# Patient Record
(demographics unavailable — no encounter records)

---

## 2024-10-29 NOTE — PHYSICAL EXAM
[Normal] : grossly intact [de-identified] : Emaciated, in bed, on oxygen, minimally speaking [de-identified] : On oxygen [de-identified] : Fatigued

## 2024-10-29 NOTE — PHYSICAL EXAM
[Normal] : grossly intact [de-identified] : Emaciated, in bed, on oxygen, minimally speaking [de-identified] : On oxygen [de-identified] : Fatigued

## 2024-10-29 NOTE — RESULTS/DATA
[FreeTextEntry1] : 3/14/24: MR Abdomen- 1. Moderate splenomegaly. Slight signal increase in liver on out-of-phase compared to in-phase sequences, with marked signal decrease in liver, spleen, and bone-marrow on T2 weighted sequences, compatible with secondary hemochromatosis. Variable enhancement throughout visualized spine (some vertebral bodies relatively hyperenhancing; other vertebral bodies relatively hyperenhancing). Indeterminate. Diffuse enhancement throughout visualized bony pelvis, indeterminate. Recommend clinical and/or laboratory correlation. Suggest nuclear medicine whole body bone scan and/or PET/CT. Consider bone marrow biopsy. 2. Mild wall thickening of proximal-mid stomach. Mild stool burden of entire ascending colon. Mild wall enhancement of proximal transverses colon. Consider upper and/or lower endoscopy. 3. Additional findings as noted above.

## 2024-10-29 NOTE — ASSESSMENT
[FreeTextEntry1] : Mr. 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 Today, Mr. Estrada and I met for the first time via televideo conference and discussed his past medical history, his overall plan of care, his current functional status which remains better chair bound, recent genetic studies including an ERBB2 exon 20 insertion found on CT DNA testing, overall prognosis, and potential for future treatments. I related that I am overall gravely concerned and estimate his life expectancy in weeks to months, that there is no safe anticancer therapy available at this time given his exceptionally poor tolerance to dose reduce gemcitabine, and that even a targeted therapy in the form of TDXD is contraindicated given his current functional status and poor recent response to chemotherapy.  Moreover, in the context of HER2 mutated cancer of unknown primary, there is no current FDA indication for the use of TDXD although there is evidence for an NSCLC that there might be some benefit albeit with significant side effect profile (>G3 toxicity in 47% of healthy trial participants).  I reinforced that at this time to focus on quality of life over longevity may be appropriate, but that I was always open to seeing Mr. Estrada again if his strength and functional status improve to consider anticancer therapies.  #Tranfusion Dependence #Cancer Associated Fatigue #Renal Dysfunction #Hepatic Dysfunction #Metastatic CUP to Marrow:  - Recommended focus on palliation - No Role for systemic treatments at this time - Continue with prior physical therapy - Follow-up appointment PRN depending on improvement in functional status  Dr. Km Louis

## 2024-10-29 NOTE — HISTORY OF PRESENT ILLNESS
[de-identified] : Evin Estrada is a previously healthy 73-year-old male who presents in the setting of recently diagnosed Cancer of Unknown Primary (CUP) metastatic to bones and bone marrow with course complicated by failure to thrive and progressive transfusion dependence.   Mr. Estrada was at his baseline state of health until November of 2023 when he received a screening CT Chest (background of 15-20 years smoking) which identified multiple sclerotic lesions of the spine. 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 He was initiated on momelotinib without improvement in symptom burden and eventually underwent repeat bone marrow several months later which was concerning for adenocarcinoma infiltrative of the marrow. Since then he has underwent complete evaluation with PET-CT, EUS, Endoscopy, and colonoscopy which has demonstrated a single hepatic lymph node in addition to marrow involvement.   Since that time he has had a marked and rapid functional decline. He is transfusion dependent (pRBC and platelets) and has had a concerning rise in his bilirubin (2.3 as of 8/22/2024). He has lost 30 pounds in the second half of 2024 and is bed or chair-bound. Additional labs are notable for CA 19-9 of 74.5.   Received reduced dose Gemcitabine 6 weeks prior to this visit at [place] which was very hard to tolerate and led to hospitalization. Currently in a rehab facility since that time. Worsening renal and hepatic dysfunction since that time; improving over last several weeks. Transfusions at ~ 1 x 2 weeks.   ECOG 4.   Guardant with circulating ctDNA ERBB2 exon 20 insertion at 4.1% (high from background).   3/14/24 - MRCP: 1. Moderate splenomegaly. Slight signal increase in liver on out-of-phase compared to in phase sequences, with marked signal decrease in liver, spleen, and bone marrow on T2- weighted sequences, compatible with secondary hemochromatosis. Variable enhancement throughout visualized spine (some vertebral bodies relatively hyperenhancing; other vertebral bodies relatively hypoenhancing), indeterminate. Diffuse enhancement throughout visualized bony pelvis, indeterminate. Recommend clinical and/or laboratory correlation. Suggest nuclear medicine whole body bone scan and/or PET/CT. Consider bone marrow biopsy. 2. Mild wall thickening of proximal-mid stomach. Mild stool burden of entire ascending colon. Mild wall enhancement of proximal transverse colon. Consider upper and/or lower endoscopy.  3. Additional findings as noted above.  7/16/24 - BM Biopsy: Bone marrow, left iliac biopsy, clot and aspirate: - metastatic carcinoma in fibrotic marrow, CAM5.2, AE1:3, MOC31, CK7 and EMA highlight the individual and small clusters of epithelial cells; no staining is seen for CK20, p40, TTF1 and prostate markers (PSMA and NKX3.1)  - the immunoresults suggest adenocarcinoma, however, specific site of origin cannot be definitively determined, possible primary sites include lung, upper gastrointestinal tract, pancreatobiliary tree among others. - CPS 0  8/9/24 - EGD: A. Duodenal nodule; biopsy: - Enteric mucosa with focus of gastric foveolar metaplasia, suggestive of chronic duodenitis. B. Esophageal nodule; biopsy: - Features suggestive of squamous papilloma.  8/14/24 - EUS A. Portal hepatic lymph node FNB: Poorly-differentiated adenocarcinoma, see note.  Note: Immunostains were performed. The lesional cells are positive for CK7 and CK19. There is no immunoreactivity for CDX2 or CK20.  Based on the findings, an upper gastrointestinal tract or pancreatobiliary origin may be considered. Clinical and radiographic correlation is recommended. Immunostains for DNA repair proteins demonstrate preservation of staining for MLH1, MSH2, MSH6, and PMS2 within tumor cell nuclei. These results do not suggest the presence of any underlying abnormality in DNA repair mechanisms. HER2 is negative (score 0). PD-L1 is positive (CPS 1).

## 2024-10-29 NOTE — HISTORY OF PRESENT ILLNESS
[de-identified] : Evin Estrada is a previously healthy 73-year-old male who presents in the setting of recently diagnosed Cancer of Unknown Primary (CUP) metastatic to bones and bone marrow with course complicated by failure to thrive and progressive transfusion dependence.   Mr. Estrada was at his baseline state of health until November of 2023 when he received a screening CT Chest (background of 15-20 years smoking) which identified multiple sclerotic lesions of the spine. 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 He was initiated on momelotinib without improvement in symptom burden and eventually underwent repeat bone marrow several months later which was concerning for adenocarcinoma infiltrative of the marrow. Since then he has underwent complete evaluation with PET-CT, EUS, Endoscopy, and colonoscopy which has demonstrated a single hepatic lymph node in addition to marrow involvement.   Since that time he has had a marked and rapid functional decline. He is transfusion dependent (pRBC and platelets) and has had a concerning rise in his bilirubin (2.3 as of 8/22/2024). He has lost 30 pounds in the second half of 2024 and is bed or chair-bound. Additional labs are notable for CA 19-9 of 74.5.   Received reduced dose Gemcitabine 6 weeks prior to this visit at [place] which was very hard to tolerate and led to hospitalization. Currently in a rehab facility since that time. Worsening renal and hepatic dysfunction since that time; improving over last several weeks. Transfusions at ~ 1 x 2 weeks.   ECOG 4.   Guardant with circulating ctDNA ERBB2 exon 20 insertion at 4.1% (high from background).   3/14/24 - MRCP: 1. Moderate splenomegaly. Slight signal increase in liver on out-of-phase compared to in phase sequences, with marked signal decrease in liver, spleen, and bone marrow on T2- weighted sequences, compatible with secondary hemochromatosis. Variable enhancement throughout visualized spine (some vertebral bodies relatively hyperenhancing; other vertebral bodies relatively hypoenhancing), indeterminate. Diffuse enhancement throughout visualized bony pelvis, indeterminate. Recommend clinical and/or laboratory correlation. Suggest nuclear medicine whole body bone scan and/or PET/CT. Consider bone marrow biopsy. 2. Mild wall thickening of proximal-mid stomach. Mild stool burden of entire ascending colon. Mild wall enhancement of proximal transverse colon. Consider upper and/or lower endoscopy.  3. Additional findings as noted above.  7/16/24 - BM Biopsy: Bone marrow, left iliac biopsy, clot and aspirate: - metastatic carcinoma in fibrotic marrow, CAM5.2, AE1:3, MOC31, CK7 and EMA highlight the individual and small clusters of epithelial cells; no staining is seen for CK20, p40, TTF1 and prostate markers (PSMA and NKX3.1)  - the immunoresults suggest adenocarcinoma, however, specific site of origin cannot be definitively determined, possible primary sites include lung, upper gastrointestinal tract, pancreatobiliary tree among others. - CPS 0  8/9/24 - EGD: A. Duodenal nodule; biopsy: - Enteric mucosa with focus of gastric foveolar metaplasia, suggestive of chronic duodenitis. B. Esophageal nodule; biopsy: - Features suggestive of squamous papilloma.  8/14/24 - EUS A. Portal hepatic lymph node FNB: Poorly-differentiated adenocarcinoma, see note.  Note: Immunostains were performed. The lesional cells are positive for CK7 and CK19. There is no immunoreactivity for CDX2 or CK20.  Based on the findings, an upper gastrointestinal tract or pancreatobiliary origin may be considered. Clinical and radiographic correlation is recommended. Immunostains for DNA repair proteins demonstrate preservation of staining for MLH1, MSH2, MSH6, and PMS2 within tumor cell nuclei. These results do not suggest the presence of any underlying abnormality in DNA repair mechanisms. HER2 is negative (score 0). PD-L1 is positive (CPS 1).